# Patient Record
Sex: MALE | Race: WHITE | NOT HISPANIC OR LATINO | Employment: FULL TIME | URBAN - METROPOLITAN AREA
[De-identification: names, ages, dates, MRNs, and addresses within clinical notes are randomized per-mention and may not be internally consistent; named-entity substitution may affect disease eponyms.]

---

## 2018-07-13 ENCOUNTER — OFFICE VISIT (OUTPATIENT)
Dept: DERMATOLOGY | Facility: CLINIC | Age: 63
End: 2018-07-13
Payer: COMMERCIAL

## 2018-07-13 DIAGNOSIS — L82.1 SK (SEBORRHEIC KERATOSIS): ICD-10-CM

## 2018-07-13 DIAGNOSIS — Z12.83 SKIN CANCER SCREENING: Primary | ICD-10-CM

## 2018-07-13 DIAGNOSIS — L57.0 MULTIPLE ACTINIC KERATOSES: ICD-10-CM

## 2018-07-13 DIAGNOSIS — D22.9 MULTIPLE BENIGN NEVI: ICD-10-CM

## 2018-07-13 PROCEDURE — 99999 PR PBB SHADOW E&M-EST. PATIENT-LVL II: CPT | Mod: PBBFAC,,, | Performed by: DERMATOLOGY

## 2018-07-13 PROCEDURE — 99213 OFFICE O/P EST LOW 20 MIN: CPT | Mod: S$GLB,,, | Performed by: DERMATOLOGY

## 2018-07-13 RX ORDER — FLUOROURACIL 50 MG/G
CREAM TOPICAL
Qty: 40 G | Refills: 1 | Status: SHIPPED | OUTPATIENT
Start: 2018-07-13 | End: 2022-12-02 | Stop reason: SDUPTHER

## 2018-07-13 RX ORDER — LISINOPRIL 10 MG/1
10 TABLET ORAL DAILY
COMMUNITY

## 2018-07-13 RX ORDER — PREDNISOLONE ACETATE 10 MG/ML
SUSPENSION/ DROPS OPHTHALMIC
Refills: 0 | COMMUNITY
Start: 2018-06-25

## 2018-07-13 NOTE — PROGRESS NOTES
Subjective:       Patient ID:  Alvino Clay is a 62 y.o. male who presents for   Chief Complaint   Patient presents with    Skin Check     UBSE     HPI  Interested in upper body skin check today.  Pt has a hx of AK's treated with cryo and efudex.  PDT scalp was recommended by Dr. Worrell, but he hasn't done it.  Last skin check in 2015.  Has felt more rough areas on scalp since last visit. Some are slightly tender. No recent tx.    Review of Systems   Constitutional: Negative for fever, chills, weight loss, weight gain, fatigue, night sweats and malaise.   Skin: Negative for daily sunscreen use, activity-related sunscreen use and recent sunburn.   Hematologic/Lymphatic: Does not bruise/bleed easily.        Objective:    Physical Exam   Constitutional: He appears well-developed and well-nourished. No distress.   Neurological: He is alert and oriented to person, place, and time. He is not disoriented.   Psychiatric: He has a normal mood and affect.   Skin:   Areas Examined (abnormalities noted in diagram):   Scalp / Hair Palpated and Inspected  Head / Face Inspection Performed  Neck Inspection Performed  Chest / Axilla Inspection Performed  Abdomen Inspection Performed  Back Inspection Performed  RUE Inspected  LUE Inspection Performed                   Diagram Legend     Erythematous scaling macule/papule c/w actinic keratosis       Vascular papule c/w angioma      Pigmented verrucoid papule/plaque c/w seborrheic keratosis      Yellow umbilicated papule c/w sebaceous hyperplasia      Irregularly shaped tan macule c/w lentigo     1-2 mm smooth white papules consistent with Milia      Movable subcutaneous cyst with punctum c/w epidermal inclusion cyst      Subcutaneous movable cyst c/w pilar cyst      Firm pink to brown papule c/w dermatofibroma      Pedunculated fleshy papule(s) c/w skin tag(s)      Evenly pigmented macule c/w junctional nevus     Mildly variegated pigmented, slightly irregular-bordered macule  c/w mildly atypical nevus      Flesh colored to evenly pigmented papule c/w intradermal nevus       Pink pearly papule/plaque c/w basal cell carcinoma      Erythematous hyperkeratotic cursted plaque c/w SCC      Surgical scar with no sign of skin cancer recurrence      Open and closed comedones      Inflammatory papules and pustules      Verrucoid papule consistent consistent with wart     Erythematous eczematous patches and plaques     Dystrophic onycholytic nail with subungual debris c/w onychomycosis     Umbilicated papule    Erythematous-base heme-crusted tan verrucoid plaque consistent with inflamed seborrheic keratosis     Erythematous Silvery Scaling Plaque c/w Psoriasis     See annotation      Assessment / Plan:        Skin cancer screening  Upper body skin examination performed today including at least 6 points as noted in physical examination. No lesions suspicious for malignancy noted.  Patient instructed in importance of daily broad spectrum sunscreen use with spf at least 30. Sun avoidance and topical protection/protective clothing discussed.    SK (seborrheic keratosis)  These are benign inherited growths without a malignant potential. Reassurance given to patient. No treatment is necessary.   Treatment of benign, asymptomatic lesions may be considered cosmetic.  Warned about risk of hypo- or hyperpigmentation with treatment and risk of recurrence.    Multiple actinic keratoses- scalp  -     fluorouracil (EFUDEX) 5 % cream; AAA on scalp BID x 2-3 weeks. Stop if blistered, oozing, or bleeding. Use daily sun protection.  Dispense: 40 g; Refill: 1  Counseled extensively regarding the proper use and side effects of efudex. Pt will discontinue use if areas begin to ulcerate, bleed, blister, etc.    Multiple benign nevi  Benign-appearing on exam today. Counseled pt to monitor mole(s) and return to clinic if any changes noted or symptoms (bleeding, itching, pain, etc) noted. Brochure provided.    Follow-up in  about 4 months (around 11/13/2018) for skin check or sooner for any concerns.

## 2018-07-13 NOTE — PATIENT INSTRUCTIONS

## 2022-09-08 ENCOUNTER — OCCUPATIONAL HEALTH (OUTPATIENT)
Dept: URGENT CARE | Facility: CLINIC | Age: 67
End: 2022-09-08

## 2022-09-08 DIAGNOSIS — Z13.9 ENCOUNTER FOR SCREENING: Primary | ICD-10-CM

## 2022-09-08 PROCEDURE — 99199 UNLISTED SPECIAL SVC PX/RPRT: CPT | Mod: S$GLB,,, | Performed by: EMERGENCY MEDICINE

## 2022-09-08 PROCEDURE — 99199 OCC MED MRO FEE: ICD-10-PCS | Mod: S$GLB,,, | Performed by: EMERGENCY MEDICINE

## 2022-09-08 PROCEDURE — 80305 DRUG TEST PRSMV DIR OPT OBS: CPT | Mod: S$GLB,,, | Performed by: EMERGENCY MEDICINE

## 2022-09-08 PROCEDURE — 80305 OOH COLLECTION ONLY DRUG SCREEN: ICD-10-PCS | Mod: S$GLB,,, | Performed by: EMERGENCY MEDICINE

## 2022-11-02 DIAGNOSIS — L57.0 MULTIPLE ACTINIC KERATOSES: ICD-10-CM

## 2022-11-03 RX ORDER — FLUOROURACIL 50 MG/G
CREAM TOPICAL
Qty: 40 G | Refills: 1 | OUTPATIENT
Start: 2022-11-03

## 2022-12-02 ENCOUNTER — OFFICE VISIT (OUTPATIENT)
Dept: DERMATOLOGY | Facility: CLINIC | Age: 67
End: 2022-12-02
Payer: COMMERCIAL

## 2022-12-02 DIAGNOSIS — Z12.83 SKIN CANCER SCREENING: Primary | ICD-10-CM

## 2022-12-02 DIAGNOSIS — L57.0 MULTIPLE ACTINIC KERATOSES: ICD-10-CM

## 2022-12-02 DIAGNOSIS — L57.0 AK (ACTINIC KERATOSIS): ICD-10-CM

## 2022-12-02 DIAGNOSIS — D18.01 CHERRY ANGIOMA: ICD-10-CM

## 2022-12-02 DIAGNOSIS — L82.1 SK (SEBORRHEIC KERATOSIS): ICD-10-CM

## 2022-12-02 DIAGNOSIS — D22.9 MULTIPLE BENIGN NEVI: ICD-10-CM

## 2022-12-02 PROCEDURE — 17000 PR DESTRUCTION(LASER SURGERY,CRYOSURGERY,CHEMOSURGERY),PREMALIGNANT LESIONS,FIRST LESION: ICD-10-PCS | Mod: S$GLB,,, | Performed by: DERMATOLOGY

## 2022-12-02 PROCEDURE — 99999 PR PBB SHADOW E&M-EST. PATIENT-LVL III: ICD-10-PCS | Mod: PBBFAC,,, | Performed by: DERMATOLOGY

## 2022-12-02 PROCEDURE — 4010F PR ACE/ARB THEARPY RXD/TAKEN: ICD-10-PCS | Mod: CPTII,S$GLB,, | Performed by: DERMATOLOGY

## 2022-12-02 PROCEDURE — 99213 OFFICE O/P EST LOW 20 MIN: CPT | Mod: 25,S$GLB,, | Performed by: DERMATOLOGY

## 2022-12-02 PROCEDURE — 1160F RVW MEDS BY RX/DR IN RCRD: CPT | Mod: CPTII,S$GLB,, | Performed by: DERMATOLOGY

## 2022-12-02 PROCEDURE — 99213 PR OFFICE/OUTPT VISIT, EST, LEVL III, 20-29 MIN: ICD-10-PCS | Mod: 25,S$GLB,, | Performed by: DERMATOLOGY

## 2022-12-02 PROCEDURE — 17003 DESTRUCTION, PREMALIGNANT LESIONS; SECOND THROUGH 14 LESIONS: ICD-10-PCS | Mod: S$GLB,,, | Performed by: DERMATOLOGY

## 2022-12-02 PROCEDURE — 4010F ACE/ARB THERAPY RXD/TAKEN: CPT | Mod: CPTII,S$GLB,, | Performed by: DERMATOLOGY

## 2022-12-02 PROCEDURE — 1159F MED LIST DOCD IN RCRD: CPT | Mod: CPTII,S$GLB,, | Performed by: DERMATOLOGY

## 2022-12-02 PROCEDURE — 17003 DESTRUCT PREMALG LES 2-14: CPT | Mod: S$GLB,,, | Performed by: DERMATOLOGY

## 2022-12-02 PROCEDURE — 1101F PT FALLS ASSESS-DOCD LE1/YR: CPT | Mod: CPTII,S$GLB,, | Performed by: DERMATOLOGY

## 2022-12-02 PROCEDURE — 99999 PR PBB SHADOW E&M-EST. PATIENT-LVL III: CPT | Mod: PBBFAC,,, | Performed by: DERMATOLOGY

## 2022-12-02 PROCEDURE — 3288F PR FALLS RISK ASSESSMENT DOCUMENTED: ICD-10-PCS | Mod: CPTII,S$GLB,, | Performed by: DERMATOLOGY

## 2022-12-02 PROCEDURE — 1160F PR REVIEW ALL MEDS BY PRESCRIBER/CLIN PHARMACIST DOCUMENTED: ICD-10-PCS | Mod: CPTII,S$GLB,, | Performed by: DERMATOLOGY

## 2022-12-02 PROCEDURE — 17000 DESTRUCT PREMALG LESION: CPT | Mod: S$GLB,,, | Performed by: DERMATOLOGY

## 2022-12-02 PROCEDURE — 3288F FALL RISK ASSESSMENT DOCD: CPT | Mod: CPTII,S$GLB,, | Performed by: DERMATOLOGY

## 2022-12-02 PROCEDURE — 1159F PR MEDICATION LIST DOCUMENTED IN MEDICAL RECORD: ICD-10-PCS | Mod: CPTII,S$GLB,, | Performed by: DERMATOLOGY

## 2022-12-02 PROCEDURE — 1101F PR PT FALLS ASSESS DOC 0-1 FALLS W/OUT INJ PAST YR: ICD-10-PCS | Mod: CPTII,S$GLB,, | Performed by: DERMATOLOGY

## 2022-12-02 RX ORDER — LISINOPRIL 10 MG/1
1 TABLET ORAL DAILY
COMMUNITY
Start: 2022-11-21

## 2022-12-02 RX ORDER — PRAVASTATIN SODIUM 40 MG/1
40 TABLET ORAL
COMMUNITY
Start: 2022-11-21

## 2022-12-02 RX ORDER — FLUOROURACIL 50 MG/G
CREAM TOPICAL
Qty: 40 G | Refills: 1 | Status: SHIPPED | OUTPATIENT
Start: 2022-12-02

## 2022-12-02 NOTE — PATIENT INSTRUCTIONS
Sun Protection      The [facility] would like to remind you of the importance of sun protection all year round and particularly during the summer when the suns rays are the strongest. It has been proven that both acute and chronic sun exposure damages our cells and leads to skin cancer. Beyond skin cancer, the sun causes 90% of the symptoms of premature skin aging, including wrinkles, lentigines (brown spots), and thin, easily bruised skin. Proper sun protection can help prevent these unwanted conditions.    Many patients report that they dont go in the sun. It has been shown that the average person receives 18 hours of incidental sun exposure per week during activities such as walking through parking lots, driving, or sitting next to windows. This accumulates to several bad sunburns per year!    In choosing sunscreen, you want one that protects against both UVA and UVB rays (broad spectrum). It is recommended that you use one of SPF 30 or higher. It is important to apply the sunscreen about 20 minutes prior to sun exposure. Most sunscreens are chemical sunscreens and a reaction must take place in the skin so that they are effective. If they are applied and then you are immediately exposed to the sun or start sweating, this reaction has not had time to take place and you are therefore unprotected. Sunscreen needs to be reapplied every 2 hours if you are participating in water sports or sweating. We recommend Elta MD or CeraVe sunscreens for daily use; however there are many options and it is most important for you to find one that you will use on a consistent basis.    If you have sensitive skin, you may do best with a sunscreen that contains only physical blockers in the active ingredient section. The only physical blockers available in the [place] currently are titanium dioxide or zinc oxide. These are typically thicker and harder to apply, however they afford very good protection.  Neutrogena Sensitive Skin, Blue Lizard Sensitive Skin (pink top) or Neutrogena Pure and Free are popular ones.     Aside from sunscreen, clothes with UV protection (UPF), wide brimmed hats, and sunglasses are other means of sun protection that we recommend.      Based on a recent study (6/2021) and out of an abundance of caution, we are recommending that you AVOID the following sunscreens as they may contain the carcinogen, benzene:    Spray and gel sunscreens  Any CVS or Wal[employer]reens brands as well as Max Block and TopCare brands   Neutrogena Ultra Sheer Dry-touch Water Resistant Sunscreen LOTION SPF 70   Neutrogena Sheer Zinc Dry-touch Face Sunscreen LOTION SPF 50   5.   Aveeno Baby Continuous Protection Sensitive Skin Sunscreen LOTION - Broad Spectrum SPF 50    Please note that Benzene is not an ingredient or the degradation product of any ingredient in any sunscreen. This study suggested that the findings are a result of contamination in the manufacturing process. At this point, we don't know how effectively Benzene gets through the skin, if it gets absorbed systemically, and what effects it may have.     We do know that ultraviolet radiation is a well-established carcinogen. Please use daily sun protection/avoidance and use of at least SPF 30, broad-spectrum sunscreen not listed above.                       [facility] - DERMATOLOGY [address]  [place] [address]  Dept: [phone]  Dept Fax: [phone]                                                                              SEBORRHEIC KERATOSES        What causes seborrheic keratoses?    Seborrheic keratoses are harmless, common skin growths that first appear during adult life.  As time goes by, more growths appear.  Some persons have a very large number of them.  Seborrheic keratoses appear on both covered and uncovered parts of the body; they are not caused by sunlight.  The tendency to develop seborrheic  keratoses is inherited.    Seborrheic keratoses are harmless and never become malignant.  They begin as slightly raised, light brown spots.  Gradually they thicken and take on a rough wartlike surface.  They slowly darken and may turn black.  These color changes are harmless.  Seborrheic keratoses are superficial and look as if they were stuck on the skin.  Persons who have had several seborrheic keratoses can usually recognize this type of benign growth.  However, if you are concerned or unsure about any growth, consult me.    Treatment    Seborrheic keratoses can easily be removed in the office.  The only reason for removing a seborrheic keratosis is your wish to get rid of it.   CRYOSURGERY      Your doctor has used a method called cryosurgery to treat your skin condition. Cryosurgery refers to the use of very cold substances to treat a variety of skin conditions such as warts, pre-skin cancers, molluscum contagiosum, sun spots, and several benign growths. The substance we use in cryosurgery is liquid nitrogen and is so cold (-195 degrees Celsius) that is burns when administered.     Following treatment in the office, the skin may immediately burn and become red. You may find the area around the lesion is affected as well. It is sometimes necessary to treat not only the lesion, but a small area of the surrounding normal skin to achieve a good response.     A blister, and even a blood filled blister, may form after treatment.   This is a normal response. If the blister is painful, it is acceptable to sterilize a needle and with rubbing alcohol and gently pop the blister. It is important that you gently wash the area with soap and warm water as the blister fluid may contain wart virus if a wart was treated. Do no remove the roof of the blister.     The area treated can take anywhere from 1-3 weeks to heal. Healing time depends on the kind skin lesion treated, the location, and how aggressively the lesion was  treated. It is recommended that the areas treated are covered with Vaseline or bacitracin ointment and a band-aid. If a band-aid is not practical, just ointment applied several times per day will do. Keeping these areas moist will speed the healing time.    Treatment with liquid nitrogen can leave a scar. In dark skin, it may be a light or dark scar, in light skin it may be a white or pink scar. These will generally fade with time, but may never go away completely.     If you have any concerns after your treatment, please feel free to call the office.       [facility] [place] [address]/ [phone] FAX/ [email or web address]

## 2022-12-02 NOTE — PROGRESS NOTES
Subjective:       Patient ID:  Alvino Clay is a 67 y.o. male who presents for   Chief Complaint   Patient presents with    Skin Check     UBSE     HPI  Pt presents today for an UBSE.   Denies any other concerns on skin today.   Has used cryo and Efudex for AKs in the past.  Last skin check was in 2018.  Has been using Efudex on scalp twice a year since last visit.    Review of Systems   Constitutional:  Negative for fever, chills and fatigue.   Skin:  Positive for activity-related sunscreen use. Negative for daily sunscreen use and recent sunburn.   Hematologic/Lymphatic: Does not bruise/bleed easily.      Objective:    Physical Exam   Constitutional: He appears well-developed and well-nourished. No distress.   Neurological: He is alert and oriented to person, place, and time. He is not disoriented.   Psychiatric: He has a normal mood and affect.   Skin:   Areas Examined (abnormalities noted in diagram):   Head / Face Inspection Performed  Neck Inspection Performed  Chest / Axilla Inspection Performed  Back Inspection Performed  RUE Inspected  LUE Inspection Performed                 Diagram Legend     Erythematous scaling macule/papule c/w actinic keratosis       Vascular papule c/w angioma      Pigmented verrucoid papule/plaque c/w seborrheic keratosis      Yellow umbilicated papule c/w sebaceous hyperplasia      Irregularly shaped tan macule c/w lentigo     1-2 mm smooth white papules consistent with Milia      Movable subcutaneous cyst with punctum c/w epidermal inclusion cyst      Subcutaneous movable cyst c/w pilar cyst      Firm pink to brown papule c/w dermatofibroma      Pedunculated fleshy papule(s) c/w skin tag(s)      Evenly pigmented macule c/w junctional nevus     Mildly variegated pigmented, slightly irregular-bordered macule c/w mildly atypical nevus      Flesh colored to evenly pigmented papule c/w intradermal nevus       Pink pearly papule/plaque c/w basal cell carcinoma      Erythematous  hyperkeratotic cursted plaque c/w SCC      Surgical scar with no sign of skin cancer recurrence      Open and closed comedones      Inflammatory papules and pustules      Verrucoid papule consistent consistent with wart     Erythematous eczematous patches and plaques     Dystrophic onycholytic nail with subungual debris c/w onychomycosis     Umbilicated papule    Erythematous-base heme-crusted tan verrucoid plaque consistent with inflamed seborrheic keratosis     Erythematous Silvery Scaling Plaque c/w Psoriasis     See annotation      Assessment / Plan:        Skin cancer screening  Upper body skin examination performed today including at least 6 points as noted in physical examination. No lesions suspicious for malignancy noted.  Patient instructed in importance of daily broad spectrum sunscreen use with spf at least 30. Sun avoidance and topical protection/protective clothing discussed.    AK (actinic keratosis)  Cryosurgery Procedure Note    Verbal consent from the patient is obtained including, but not limited to, risk of hypopigmentation/hyperpigmentation, scar, recurrence of lesion. The patient is aware of the precancerous quality and need for treatment of these lesions. Liquid nitrogen cryosurgery is applied to the 9 actinic keratoses, as detailed in the physical exam, to produce a freeze injury. The patient is aware that blisters may form and is instructed on wound care with gentle cleansing and use of vaseline ointment to keep moist until healed. The patient is supplied a handout on cryosurgery and is instructed to call if lesions do not completely resolve.    SK (seborrheic keratosis)  These are benign inherited growths without a malignant potential. Reassurance given to patient. No treatment is necessary.   Treatment of benign, asymptomatic lesions may be considered cosmetic.  Warned about risk of hypo- or hyperpigmentation with treatment and risk of recurrence.    Cherry angioma  This is a benign  vascular lesion. Reassurance given. No treatment required. Treatment of benign, asymptomatic lesions may be considered cosmetic.    Multiple benign nevi  Benign-appearing nevi present on exam today. Reassurance provided. Periodically examine moles and return to clinic if any moles change or become symptomatic (bleeding, itching, pain, etc).    Multiple actinic keratoses  Rec: decreasing to once yearly efudex on scalp, but notify me first if any concerning lesions present  -     fluorouraciL (EFUDEX) 5 % cream; AAA on scalp BID x 2-3 weeks. Stop if blistered, oozing, or bleeding. Use daily sun protection.  Dispense: 40 g; Refill: 1  Counseled extensively regarding the proper use and side effects of efudex. Pt will discontinue use if areas begin to ulcerate, bleed, blister, etc.    Follow up in about 6 months (around 6/2/2023) for skin check or sooner for any concerns.

## 2024-06-03 ENCOUNTER — OFFICE VISIT (OUTPATIENT)
Dept: DERMATOLOGY | Facility: CLINIC | Age: 69
End: 2024-06-03
Payer: MEDICARE

## 2024-06-03 DIAGNOSIS — D18.01 CHERRY ANGIOMA: ICD-10-CM

## 2024-06-03 DIAGNOSIS — L57.0 AK (ACTINIC KERATOSIS): Primary | ICD-10-CM

## 2024-06-03 DIAGNOSIS — D22.9 MULTIPLE BENIGN NEVI: ICD-10-CM

## 2024-06-03 DIAGNOSIS — Z12.83 SKIN CANCER SCREENING: ICD-10-CM

## 2024-06-03 DIAGNOSIS — L82.1 SK (SEBORRHEIC KERATOSIS): ICD-10-CM

## 2024-06-03 PROCEDURE — 1159F MED LIST DOCD IN RCRD: CPT | Mod: CPTII,S$GLB,, | Performed by: DERMATOLOGY

## 2024-06-03 PROCEDURE — 3288F FALL RISK ASSESSMENT DOCD: CPT | Mod: CPTII,S$GLB,, | Performed by: DERMATOLOGY

## 2024-06-03 PROCEDURE — 1160F RVW MEDS BY RX/DR IN RCRD: CPT | Mod: CPTII,S$GLB,, | Performed by: DERMATOLOGY

## 2024-06-03 PROCEDURE — 1126F AMNT PAIN NOTED NONE PRSNT: CPT | Mod: CPTII,S$GLB,, | Performed by: DERMATOLOGY

## 2024-06-03 PROCEDURE — 99213 OFFICE O/P EST LOW 20 MIN: CPT | Mod: 25,S$GLB,, | Performed by: DERMATOLOGY

## 2024-06-03 PROCEDURE — 4010F ACE/ARB THERAPY RXD/TAKEN: CPT | Mod: CPTII,S$GLB,, | Performed by: DERMATOLOGY

## 2024-06-03 PROCEDURE — 1101F PT FALLS ASSESS-DOCD LE1/YR: CPT | Mod: CPTII,S$GLB,, | Performed by: DERMATOLOGY

## 2024-06-03 PROCEDURE — 17000 DESTRUCT PREMALG LESION: CPT | Mod: S$GLB,,, | Performed by: DERMATOLOGY

## 2024-06-03 PROCEDURE — 17003 DESTRUCT PREMALG LES 2-14: CPT | Mod: S$GLB,,, | Performed by: DERMATOLOGY

## 2024-06-03 PROCEDURE — 99999 PR PBB SHADOW E&M-EST. PATIENT-LVL II: CPT | Mod: PBBFAC,,, | Performed by: DERMATOLOGY

## 2024-06-03 NOTE — PATIENT INSTRUCTIONS
CRYOSURGERY      Your doctor has used a method called cryosurgery to treat your skin condition. Cryosurgery refers to the use of very cold substances to treat a variety of skin conditions such as warts, pre-skin cancers, molluscum contagiosum, sun spots, and several benign growths. The substance we use in cryosurgery is liquid nitrogen and is so cold (-195 degrees Celsius) that is burns when administered.     Following treatment in the office, the skin may immediately burn and become red. You may find the area around the lesion is affected as well. It is sometimes necessary to treat not only the lesion, but a small area of the surrounding normal skin to achieve a good response.     A blister, and even a blood filled blister, may form after treatment.   This is a normal response. If the blister is painful, it is acceptable to sterilize a needle and with rubbing alcohol and gently pop the blister. It is important that you gently wash the area with soap and warm water as the blister fluid may contain wart virus if a wart was treated. Do no remove the roof of the blister.     The area treated can take anywhere from 1-3 weeks to heal. Healing time depends on the kind skin lesion treated, the location, and how aggressively the lesion was treated. It is recommended that the areas treated are covered with Vaseline or bacitracin ointment and a band-aid. If a band-aid is not practical, just ointment applied several times per day will do. Keeping these areas moist will speed the healing time.    Treatment with liquid nitrogen can leave a scar. In dark skin, it may be a light or dark scar, in light skin it may be a white or pink scar. These will generally fade with time, but may never go away completely.     If you have any concerns after your treatment, please feel free to call the office.       [address] [facility] [place] [address]/ [phone] FAX/ [email or web address] Sun Protection      The [facility] would like to remind you of the importance of sun protection all year round and particularly during the summer when the suns rays are the strongest. It has been proven that both acute and chronic sun exposure damages our cells and leads to skin cancer. Beyond skin cancer, the sun causes 90% of the symptoms of premature skin aging, including wrinkles, lentigines (brown spots), and thin, easily bruised skin. Proper sun protection can help prevent these unwanted conditions.    Many patients report that they dont go in the sun. It has been shown that the average person receives 18 hours of incidental sun exposure per week during activities such as walking through parking lots, driving, or sitting next to windows. This accumulates to several bad sunburns per year!    In choosing sunscreen, you want one that protects against both UVA and UVB rays (broad spectrum). It is recommended that you use one of SPF 30 or higher. It is important to apply the sunscreen about 20 minutes prior to sun exposure. Most sunscreens are chemical sunscreens and a reaction must take place in the skin so that they are effective. If they are applied and then you are immediately exposed to the sun or start sweating, this reaction has not had time to take place and you are therefore unprotected. Sunscreen needs to be reapplied every 2 hours if you are participating in water sports or sweating. We recommend Elta MD or CeraVe sunscreens for daily use; however there are many options and it is most important for you to find one that you will use on a consistent basis.    If you have sensitive skin, you may do best with a sunscreen that contains only physical blockers in the active ingredient section. The only physical blockers available in the [place] currently are titanium dioxide or zinc oxide. These are typically thicker and harder to apply, however they afford very good protection. Neutrogena Sensitive Skin, Blue Lizard  Sensitive Skin (pink top) or Neutrogena Pure and Free are popular ones.     Aside from sunscreen, clothes with UV protection (UPF), wide brimmed hats, and sunglasses are other means of sun protection that we recommend.      Based on a recent study (6/2021) and out of an abundance of caution, we are recommending that you AVOID the following sunscreens as they may contain the carcinogen, benzene:    Spray and gel sunscreens  Any CVS or Walgreens brands as well as Max Block and TopCare brands   Neutrogena Ultra Sheer Dry-touch Water Resistant Sunscreen LOTION SPF 70   Neutrogena Sheer Zinc Dry-touch Face Sunscreen LOTION SPF 50   5.   Aveeno Baby Continuous Protection Sensitive Skin Sunscreen LOTION - Broad Spectrum SPF 50    Please note that Benzene is not an ingredient or the degradation product of any ingredient in any sunscreen. This study suggested that the findings are a result of contamination in the manufacturing process. At this point, we don't know how effectively Benzene gets through the skin, if it gets absorbed systemically, and what effects it may have.     We do know that ultraviolet radiation is a well-established carcinogen. Please use daily sun protection/avoidance and use of at least SPF 30, broad-spectrum sunscreen not listed above.                       [facility]  [facility] - DERMATOLOGY [address]  [facility] [address]  Dept: [phone]  Dept Fax: [phone]

## 2024-06-03 NOTE — PROGRESS NOTES
Subjective:      Patient ID:  Alvino Clay is a 68 y.o. male who presents for   Chief Complaint   Patient presents with    Skin Check     UBSE     Pt present for UBSE    Pt was last seen on 12/02/2022:  Has used cryo and Efudex for AKs in the past.  Last skin check was in 2022.  Uses Efudex on scalp periodically.    No h/o skin cancer.       Used Efudex scalp and forehead for the whole month of April. States it was rough.      Review of Systems   Constitutional:  Negative for fever, chills and fatigue.   Skin:  Positive for activity-related sunscreen use. Negative for daily sunscreen use and recent sunburn.   Hematologic/Lymphatic: Does not bruise/bleed easily.       Objective:   Physical Exam   Constitutional: He appears well-developed and well-nourished. No distress.   Neurological: He is alert and oriented to person, place, and time. He is not disoriented.   Psychiatric: He has a normal mood and affect.   Skin:   Areas Examined (abnormalities noted in diagram):   Scalp / Hair Palpated and Inspected  Head / Face Inspection Performed  Neck Inspection Performed  Chest / Axilla Inspection Performed  Abdomen Inspection Performed  Back Inspection Performed  RUE Inspected  LUE Inspection Performed                 Diagram Legend     Erythematous scaling macule/papule c/w actinic keratosis       Vascular papule c/w angioma      Pigmented verrucoid papule/plaque c/w seborrheic keratosis      Yellow umbilicated papule c/w sebaceous hyperplasia      Irregularly shaped tan macule c/w lentigo     1-2 mm smooth white papules consistent with Milia      Movable subcutaneous cyst with punctum c/w epidermal inclusion cyst      Subcutaneous movable cyst c/w pilar cyst      Firm pink to brown papule c/w dermatofibroma      Pedunculated fleshy papule(s) c/w skin tag(s)      Evenly pigmented macule c/w junctional nevus     Mildly variegated pigmented, slightly irregular-bordered macule c/w mildly atypical nevus      Flesh  colored to evenly pigmented papule c/w intradermal nevus       Pink pearly papule/plaque c/w basal cell carcinoma      Erythematous hyperkeratotic cursted plaque c/w SCC      Surgical scar with no sign of skin cancer recurrence      Open and closed comedones      Inflammatory papules and pustules      Verrucoid papule consistent consistent with wart     Erythematous eczematous patches and plaques     Dystrophic onycholytic nail with subungual debris c/w onychomycosis     Umbilicated papule    Erythematous-base heme-crusted tan verrucoid plaque consistent with inflamed seborrheic keratosis     Erythematous Silvery Scaling Plaque c/w Psoriasis     See annotation      Assessment / Plan:        AK (actinic keratosis)  Cryosurgery Procedure Note    Verbal consent from the patient is obtained including, but not limited to, risk of hypopigmentation/hyperpigmentation, scar, recurrence of lesion. The patient is aware of the precancerous quality and need for treatment of these lesions. Liquid nitrogen cryosurgery is applied to the 5 actinic keratoses, as detailed in the physical exam, to produce a freeze injury. The patient is aware that blisters may form and is instructed on wound care with gentle cleansing and use of vaseline ointment to keep moist until healed. The patient is supplied a handout on cryosurgery and is instructed to call if lesions do not completely resolve.    SK (seborrheic keratosis)  These are benign inherited growths without a malignant potential. Reassurance given to patient. No treatment is necessary.   Treatment of benign, asymptomatic lesions may be considered cosmetic.  Warned about risk of hypo- or hyperpigmentation with treatment and risk of recurrence.    Cherry angioma  This is a benign vascular lesion. Reassurance given. No treatment required. Treatment of benign, asymptomatic lesions may be considered cosmetic.    Multiple benign nevi  Benign-appearing nevi present on exam today. Reassurance  provided. Periodically examine moles and return to clinic if any moles change or become symptomatic (bleeding, itching, pain, etc).    Skin cancer screening  Upper body skin examination performed today including at least 6 points as noted in physical examination. No lesions suspicious for malignancy noted.  Patient instructed in importance of daily broad spectrum sunscreen use with spf at least 30. Sun avoidance and topical protection/protective clothing discussed.    Follow up in about 1 year (around 6/3/2025) for skin check or sooner for any concerns.